# Patient Record
Sex: MALE | Race: WHITE | Employment: UNEMPLOYED | URBAN - METROPOLITAN AREA
[De-identification: names, ages, dates, MRNs, and addresses within clinical notes are randomized per-mention and may not be internally consistent; named-entity substitution may affect disease eponyms.]

---

## 2024-03-23 ENCOUNTER — HOSPITAL ENCOUNTER (EMERGENCY)
Age: 19
Discharge: HOME | End: 2024-03-23
Attending: EMERGENCY MEDICINE
Payer: COMMERCIAL

## 2024-03-23 ENCOUNTER — APPOINTMENT (OUTPATIENT)
Dept: CT IMAGING | Age: 19
End: 2024-03-23
Payer: COMMERCIAL

## 2024-03-23 VITALS
HEIGHT: 71 IN | SYSTOLIC BLOOD PRESSURE: 111 MMHG | TEMPERATURE: 98 F | BODY MASS INDEX: 27.16 KG/M2 | HEART RATE: 53 BPM | DIASTOLIC BLOOD PRESSURE: 73 MMHG | OXYGEN SATURATION: 99 % | RESPIRATION RATE: 20 BRPM | WEIGHT: 194 LBS

## 2024-03-23 DIAGNOSIS — K59.00 CONSTIPATION, UNSPECIFIED CONSTIPATION TYPE: Primary | ICD-10-CM

## 2024-03-23 LAB
ALBUMIN SERPL-MCNC: 4.1 G/DL (ref 3.5–5)
ALBUMIN/GLOB SERPL: 1.4 (ref 0.4–1.6)
ALP SERPL-CCNC: 102 U/L (ref 45–117)
ALT SERPL-CCNC: 16 U/L (ref 13–61)
ANION GAP SERPL CALC-SCNC: 9 MMOL/L (ref 2–11)
APPEARANCE UR: NORMAL
AST SERPL-CCNC: 20 U/L (ref 15–37)
BASOPHILS # BLD: 0 K/UL (ref 0–0.2)
BASOPHILS NFR BLD: 1 % (ref 0–2)
BILIRUB SERPL-MCNC: 0.5 MG/DL (ref 0.2–1.1)
BILIRUB UR QL: NEGATIVE
BUN SERPL-MCNC: 17 MG/DL (ref 6–23)
CALCIUM SERPL-MCNC: 9.4 MG/DL (ref 8.3–10.4)
CHLORIDE SERPL-SCNC: 106 MMOL/L (ref 98–107)
CO2 SERPL-SCNC: 25 MMOL/L (ref 21–32)
COLOR UR: YELLOW
CREAT SERPL-MCNC: 0.87 MG/DL (ref 0.8–1.5)
DIFFERENTIAL METHOD BLD: ABNORMAL
EOSINOPHIL # BLD: 0.2 K/UL (ref 0–0.8)
EOSINOPHIL NFR BLD: 4 % (ref 0.5–7.8)
ERYTHROCYTE [DISTWIDTH] IN BLOOD BY AUTOMATED COUNT: 12.2 % (ref 11.9–14.6)
GLOBULIN SER CALC-MCNC: 2.9 G/DL (ref 2.8–4.5)
GLUCOSE SERPL-MCNC: 101 MG/DL (ref 65–100)
GLUCOSE UR STRIP.AUTO-MCNC: NEGATIVE MG/DL
HCT VFR BLD AUTO: 42.8 % (ref 41.1–50.3)
HGB BLD-MCNC: 14.4 G/DL (ref 13.6–17.2)
HGB UR QL STRIP: NEGATIVE
IMM GRANULOCYTES # BLD AUTO: 0 K/UL (ref 0–0.5)
IMM GRANULOCYTES NFR BLD AUTO: 0 % (ref 0–5)
KETONES UR QL STRIP.AUTO: NEGATIVE MG/DL
LEUKOCYTE ESTERASE UR QL STRIP.AUTO: NEGATIVE
LYMPHOCYTES # BLD: 2.2 K/UL (ref 0.5–4.6)
LYMPHOCYTES NFR BLD: 44 % (ref 13–44)
MCH RBC QN AUTO: 29.8 PG (ref 26.1–32.9)
MCHC RBC AUTO-ENTMCNC: 33.6 G/DL (ref 31.4–35)
MCV RBC AUTO: 88.4 FL (ref 82–102)
MONOCYTES # BLD: 0.5 K/UL (ref 0.1–1.3)
MONOCYTES NFR BLD: 9 % (ref 4–12)
NEUTS SEG # BLD: 2.1 K/UL (ref 1.7–8.2)
NEUTS SEG NFR BLD: 42 % (ref 43–78)
NITRITE UR QL STRIP.AUTO: NEGATIVE
NRBC # BLD: 0 K/UL (ref 0–0.2)
PH UR STRIP: 7.5 (ref 5–9)
PLATELET # BLD AUTO: 264 K/UL (ref 150–450)
PMV BLD AUTO: 9.2 FL (ref 9.4–12.3)
POTASSIUM SERPL-SCNC: 4.4 MMOL/L (ref 3.5–5.1)
PROT SERPL-MCNC: 7 G/DL (ref 6.4–8.2)
PROT UR STRIP-MCNC: NEGATIVE MG/DL
RBC # BLD AUTO: 4.84 M/UL (ref 4.23–5.6)
SODIUM SERPL-SCNC: 140 MMOL/L (ref 133–143)
SP GR UR REFRACTOMETRY: 1.02 (ref 1–1.02)
UROBILINOGEN UR QL STRIP.AUTO: 0.2 EU/DL (ref 0.2–1)
WBC # BLD AUTO: 5.1 K/UL (ref 4.3–11.1)

## 2024-03-23 PROCEDURE — 85025 COMPLETE CBC W/AUTO DIFF WBC: CPT

## 2024-03-23 PROCEDURE — 6360000004 HC RX CONTRAST MEDICATION: Performed by: EMERGENCY MEDICINE

## 2024-03-23 PROCEDURE — 74177 CT ABD & PELVIS W/CONTRAST: CPT

## 2024-03-23 PROCEDURE — 2580000003 HC RX 258: Performed by: EMERGENCY MEDICINE

## 2024-03-23 PROCEDURE — 80053 COMPREHEN METABOLIC PANEL: CPT

## 2024-03-23 PROCEDURE — 81003 URINALYSIS AUTO W/O SCOPE: CPT

## 2024-03-23 PROCEDURE — 99285 EMERGENCY DEPT VISIT HI MDM: CPT

## 2024-03-23 RX ORDER — PEG-3350, SODIUM SULFATE, SODIUM CHLORIDE, POTASSIUM CHLORIDE, SODIUM ASCORBATE AND ASCORBIC ACID 7.5-2.691G
100 KIT ORAL ONCE
Qty: 1 EACH | Refills: 0 | Status: SHIPPED | OUTPATIENT
Start: 2024-03-23 | End: 2024-03-23

## 2024-03-23 RX ORDER — 0.9 % SODIUM CHLORIDE 0.9 %
1000 INTRAVENOUS SOLUTION INTRAVENOUS ONCE
Status: COMPLETED | OUTPATIENT
Start: 2024-03-23 | End: 2024-03-23

## 2024-03-23 RX ADMIN — IOPAMIDOL 100 ML: 755 INJECTION, SOLUTION INTRAVENOUS at 12:27

## 2024-03-23 RX ADMIN — SODIUM CHLORIDE 1000 ML: 9 INJECTION, SOLUTION INTRAVENOUS at 11:22

## 2024-03-23 ASSESSMENT — PAIN SCALES - GENERAL
PAINLEVEL_OUTOF10: 8
PAINLEVEL_OUTOF10: 5

## 2024-03-23 ASSESSMENT — PAIN DESCRIPTION - PAIN TYPE: TYPE: ACUTE PAIN

## 2024-03-23 ASSESSMENT — PAIN - FUNCTIONAL ASSESSMENT
PAIN_FUNCTIONAL_ASSESSMENT: 0-10
PAIN_FUNCTIONAL_ASSESSMENT: 0-10

## 2024-03-23 ASSESSMENT — PAIN DESCRIPTION - LOCATION: LOCATION: RECTUM

## 2024-03-23 ASSESSMENT — PAIN DESCRIPTION - FREQUENCY: FREQUENCY: INTERMITTENT

## 2024-03-23 ASSESSMENT — LIFESTYLE VARIABLES
HOW MANY STANDARD DRINKS CONTAINING ALCOHOL DO YOU HAVE ON A TYPICAL DAY: PATIENT DOES NOT DRINK
HOW OFTEN DO YOU HAVE A DRINK CONTAINING ALCOHOL: NEVER

## 2024-03-23 NOTE — ED TRIAGE NOTES
Ambulatory to triage, gait deliberate. Patient c/o constipation, bright red blood when he wipes, rectal pain.

## 2024-03-23 NOTE — ED PROVIDER NOTES
Emergency Department Provider Note                   PCP:                None, None               Age: 18 y.o.      Sex: male       ICD-10-CM    1. Constipation, unspecified constipation type  K59.00           DISPOSITION Decision To Discharge 03/23/2024 01:35:33 PM        MDM  Number of Diagnoses or Management Options  Diagnosis management comments: MEDICAL DECISION MAKING  Complexity of Problems Addressed:  1 or more acute illnesses that pose a threat to life or bodily function.     Data Reviewed and Analyzed:  Category 1:   I independently ordered and reviewed each unique test.    Category 2:   I interpreted the CT Scan No free air or obstruction. Stool throughout the colon.    Category 3: Discussion of management or test interpretation.  The patient presents with abdominal pain, possible constipation. Given his symptoms for 2 weeks, blood, urine, and CT ordered.  Blood work was unremarkable.  The patient's urine was clear.  CT is read as massive stool throughout the colon.  Will place the patient on medications to treat for severe constipation.  Prescription is sent to his pharmacy of choice.  Recommend close follow-up.    Risk of Complications and/or Morbidity of Patient Management:  Prescription drug management performed.                Orders Placed This Encounter   Procedures    CT ABDOMEN PELVIS W IV CONTRAST Additional Contrast? None    CBC with Auto Differential    CMP    Urinalysis    Saline lock IV        Medications   sodium chloride 0.9 % bolus 1,000 mL (0 mLs IntraVENous Stopped 3/23/24 1251)   iopamidol (ISOVUE-370) 76 % injection 100 mL (100 mLs IntraVENous Given 3/23/24 1227)       New Prescriptions    PEG-KCL-NACL-NASULF-NA ASC-C (MOVIPREP) 100 G SOLUTION    Take 1 Bottle by mouth once for 1 dose        Anthony Caal is a 18 y.o. male who presents to the Emergency Department with chief complaint of    Chief Complaint   Patient presents with    Constipation      The patient states he has been  the making of this note.  This software is not perfect and grammatical and other typographical errors may be present.  This note has not been completely proofread for errors.     Zia Roque MD  03/23/24 5554